# Patient Record
Sex: MALE | Employment: OTHER | URBAN - METROPOLITAN AREA
[De-identification: names, ages, dates, MRNs, and addresses within clinical notes are randomized per-mention and may not be internally consistent; named-entity substitution may affect disease eponyms.]

---

## 2024-09-30 ENCOUNTER — APPOINTMENT (OUTPATIENT)
Dept: ORTHOPEDIC SURGERY | Facility: CLINIC | Age: 69
End: 2024-09-30
Payer: MEDICARE

## 2024-10-09 ENCOUNTER — OFFICE VISIT (OUTPATIENT)
Dept: ORTHOPEDIC SURGERY | Facility: CLINIC | Age: 69
End: 2024-10-09
Payer: MEDICARE

## 2024-10-09 VITALS — BODY MASS INDEX: 26.68 KG/M2 | WEIGHT: 145 LBS | HEIGHT: 62 IN

## 2024-10-09 DIAGNOSIS — M79.642 LEFT HAND PAIN: Primary | ICD-10-CM

## 2024-10-09 DIAGNOSIS — M65.352 TRIGGER LITTLE FINGER OF LEFT HAND: ICD-10-CM

## 2024-10-09 PROCEDURE — 2500000004 HC RX 250 GENERAL PHARMACY W/ HCPCS (ALT 636 FOR OP/ED): Performed by: ORTHOPAEDIC SURGERY

## 2024-10-09 PROCEDURE — 1160F RVW MEDS BY RX/DR IN RCRD: CPT | Performed by: ORTHOPAEDIC SURGERY

## 2024-10-09 PROCEDURE — 99213 OFFICE O/P EST LOW 20 MIN: CPT | Performed by: ORTHOPAEDIC SURGERY

## 2024-10-09 PROCEDURE — 99203 OFFICE O/P NEW LOW 30 MIN: CPT | Performed by: ORTHOPAEDIC SURGERY

## 2024-10-09 PROCEDURE — 3008F BODY MASS INDEX DOCD: CPT | Performed by: ORTHOPAEDIC SURGERY

## 2024-10-09 PROCEDURE — 1036F TOBACCO NON-USER: CPT | Performed by: ORTHOPAEDIC SURGERY

## 2024-10-09 PROCEDURE — 1159F MED LIST DOCD IN RCRD: CPT | Performed by: ORTHOPAEDIC SURGERY

## 2024-10-09 PROCEDURE — 20550 NJX 1 TENDON SHEATH/LIGAMENT: CPT | Mod: LT | Performed by: ORTHOPAEDIC SURGERY

## 2024-10-09 RX ORDER — OLMESARTAN MEDOXOMIL AND HYDROCHLOROTHIAZIDE 20/12.5 20; 12.5 MG/1; MG/1
1 TABLET ORAL 2 TIMES DAILY
COMMUNITY

## 2024-10-09 RX ORDER — PRAVASTATIN SODIUM 40 MG/1
1 TABLET ORAL
COMMUNITY
Start: 2024-07-09

## 2024-10-09 RX ORDER — SODIUM BICARBONATE 650 MG/1
650 TABLET ORAL 3 TIMES DAILY
COMMUNITY
Start: 2024-05-16 | End: 2024-11-12

## 2024-10-09 RX ORDER — GEMFIBROZIL 600 MG/1
1 TABLET, FILM COATED ORAL
COMMUNITY
Start: 2024-09-21

## 2024-10-09 RX ORDER — ASPIRIN 81 MG/1
81 TABLET ORAL DAILY
COMMUNITY

## 2024-10-09 RX ORDER — TRIAMCINOLONE ACETONIDE 40 MG/ML
10 INJECTION, SUSPENSION INTRA-ARTICULAR; INTRAMUSCULAR
Status: COMPLETED | OUTPATIENT
Start: 2024-10-09 | End: 2024-10-09

## 2024-10-09 RX ORDER — AMLODIPINE BESYLATE 10 MG/1
1 TABLET ORAL
COMMUNITY
Start: 2024-09-21

## 2024-10-09 RX ORDER — LIDOCAINE HYDROCHLORIDE 20 MG/ML
0.5 INJECTION, SOLUTION INFILTRATION; PERINEURAL
Status: COMPLETED | OUTPATIENT
Start: 2024-10-09 | End: 2024-10-09

## 2024-10-09 RX ORDER — GLIPIZIDE 5 MG/1
1 TABLET, FILM COATED, EXTENDED RELEASE ORAL
COMMUNITY
Start: 2024-09-24

## 2024-10-09 NOTE — PROGRESS NOTES
Subjective    Patient ID: Rolf Worley is a 68 y.o. male.    Chief Complaint: Pain of the Left Little Finger     Last Surgery: No surgery found  Last Surgery Date: No surgery found    HPI  The patient is a 68-year-old right-hand-dominant male who comes in with a complaint of left little finger pain and locking.  This has been present for approximately the last 2 weeks.  His symptoms are worse in the morning.  The patient is a non-insulin-dependent diabetic.  He denies any trauma to his left hand.    Objective   Ortho Exam  The patient is in no acute distress.  Exam of his left hand and wrist reveals his skin envelope is intact.  He has full range of motion in his fingers.  He is tender to palpation over the left little finger A1 pulley.  The little finger was triggering with flexion at the PIP joint.  There is no evidence of any other spots of tenderness.    Assessment/Plan   Encounter Diagnoses:  Left hand pain    Trigger little finger of left hand    The patient has evidence of a left little finger trigger digit.  We discussed surgical versus conservative management.  He elected to undergo a Kenalog injection.    Hand / UE Inj/Asp: L small A1 for trigger finger on 10/9/2024 9:50 AM  Indications: tendon swelling  Details: 25 G needle, volar approach  Medications: 10 mg triamcinolone acetonide 40 mg/mL; 0.5 mL lidocaine 20 mg/mL (2 %)  Outcome: tolerated well, no immediate complications  Procedure, treatment alternatives, risks and benefits explained, specific risks discussed. Consent was given by the patient. Immediately prior to procedure a time out was called to verify the correct patient, procedure, equipment, support staff and site/side marked as required. Patient was prepped and draped in the usual sterile fashion.       Patient was informed to takes about a week for the injection have an effect.  He was informed he may have a transient increase in his blood glucose.  He may use his left hand is much as he  can tolerate.  He will follow-up as his symptoms dictate.

## 2025-01-02 ENCOUNTER — APPOINTMENT (OUTPATIENT)
Dept: CARDIOLOGY | Facility: CLINIC | Age: 70
End: 2025-01-02
Payer: MEDICARE

## 2025-03-13 PROBLEM — F17.201 TOBACCO ABUSE, IN REMISSION: Status: ACTIVE | Noted: 2021-02-26

## 2025-03-13 PROBLEM — E11.9 TYPE 2 DIABETES MELLITUS, WITHOUT LONG-TERM CURRENT USE OF INSULIN (MULTI): Status: ACTIVE | Noted: 2024-04-19

## 2025-03-13 PROBLEM — E11.51 TYPE 2 DIABETES MELLITUS WITH DIABETIC PERIPHERAL ANGIOPATHY WITHOUT GANGRENE, WITHOUT LONG-TERM CURRENT USE OF INSULIN (MULTI): Status: ACTIVE | Noted: 2024-04-19

## 2025-03-13 PROBLEM — I73.9 PAD (PERIPHERAL ARTERY DISEASE) (CMS-HCC): Chronic | Status: ACTIVE | Noted: 2018-02-27

## 2025-03-13 PROBLEM — I10 HTN (HYPERTENSION): Status: ACTIVE | Noted: 2025-03-13

## 2025-03-13 PROBLEM — Z87.891 FORMER SMOKER: Status: ACTIVE | Noted: 2021-02-26

## 2025-04-03 ENCOUNTER — OFFICE VISIT (OUTPATIENT)
Dept: CARDIOLOGY | Facility: CLINIC | Age: 70
End: 2025-04-03
Payer: MEDICARE

## 2025-04-03 VITALS
WEIGHT: 147 LBS | HEIGHT: 67 IN | OXYGEN SATURATION: 95 % | DIASTOLIC BLOOD PRESSURE: 60 MMHG | BODY MASS INDEX: 23.07 KG/M2 | SYSTOLIC BLOOD PRESSURE: 120 MMHG | HEART RATE: 105 BPM

## 2025-04-03 DIAGNOSIS — E78.5 DYSLIPIDEMIA: ICD-10-CM

## 2025-04-03 DIAGNOSIS — I10 HTN (HYPERTENSION): ICD-10-CM

## 2025-04-03 DIAGNOSIS — I73.9 PAD (PERIPHERAL ARTERY DISEASE) (CMS-HCC): Primary | Chronic | ICD-10-CM

## 2025-04-03 DIAGNOSIS — I73.9 PERIPHERAL VASCULAR DISEASE, UNSPECIFIED (CMS-HCC): ICD-10-CM

## 2025-04-03 DIAGNOSIS — N18.9 CKD (CHRONIC KIDNEY DISEASE): ICD-10-CM

## 2025-04-03 PROCEDURE — 1159F MED LIST DOCD IN RCRD: CPT | Performed by: INTERNAL MEDICINE

## 2025-04-03 PROCEDURE — 99214 OFFICE O/P EST MOD 30 MIN: CPT | Performed by: INTERNAL MEDICINE

## 2025-04-03 PROCEDURE — 3008F BODY MASS INDEX DOCD: CPT | Performed by: INTERNAL MEDICINE

## 2025-04-03 PROCEDURE — 3078F DIAST BP <80 MM HG: CPT | Performed by: INTERNAL MEDICINE

## 2025-04-03 PROCEDURE — 99204 OFFICE O/P NEW MOD 45 MIN: CPT | Performed by: INTERNAL MEDICINE

## 2025-04-03 PROCEDURE — 3074F SYST BP LT 130 MM HG: CPT | Performed by: INTERNAL MEDICINE

## 2025-04-03 RX ORDER — BLOOD SUGAR DIAGNOSTIC
1 STRIP MISCELLANEOUS AS NEEDED
COMMUNITY
Start: 2025-03-05

## 2025-04-03 RX ORDER — GLIPIZIDE 10 MG/1
10 TABLET, FILM COATED, EXTENDED RELEASE ORAL DAILY
COMMUNITY
Start: 2025-04-01

## 2025-04-03 RX ORDER — LANCETS 28 GAUGE
1 EACH MISCELLANEOUS AS NEEDED
COMMUNITY
Start: 2024-08-27

## 2025-04-03 RX ORDER — SODIUM POLYSTYRENE SULFONATE 4.1 MEQ/G
POWDER, FOR SUSPENSION ORAL; RECTAL ONCE
COMMUNITY
Start: 2025-03-21

## 2025-04-03 RX ORDER — DIAZEPAM 5 MG/1
1 TABLET ORAL NIGHTLY PRN
COMMUNITY
Start: 2025-03-18

## 2025-04-03 RX ORDER — ERGOCALCIFEROL 1.25 MG/1
50000 CAPSULE ORAL WEEKLY
COMMUNITY
Start: 2024-05-16

## 2025-04-03 RX ORDER — SODIUM BICARBONATE 650 MG/1
650 TABLET ORAL 3 TIMES DAILY
COMMUNITY

## 2025-04-03 RX ORDER — EZETIMIBE 10 MG/1
10 TABLET ORAL DAILY
Qty: 90 TABLET | Refills: 3 | Status: SHIPPED | OUTPATIENT
Start: 2025-04-03 | End: 2025-04-04 | Stop reason: SDUPTHER

## 2025-04-03 NOTE — PATIENT INSTRUCTIONS
Add on Zetia for goal LDL <70 (<55 is even better) - currently you are at 110.  BP is good - keep up the good work  I will evaluate your kidney artery based on 2016 CT scan  Follow up 1 year with blood pressure cuff test

## 2025-04-03 NOTE — LETTER
April 11, 2025     Costa Vega MD  [address]  [facility] [place], [address]  [facility], [id number]    Patient: Rolf Worley   YOB: 1955   Date of Visit: 4/3/2025       Dear Dr. Costa Vega MD:    Thank you for referring Rolf Worley to me for evaluation. Below are my notes for this consultation.  If you have questions, please do not hesitate to call me. I look forward to following your patient along with you.       Sincerely,     Saad Kovacs MD      CC: No Recipients  ______________________________________________________________________________________    PCP: Costa Vega MD  Nephrologist: TBD    Subjective  Rolf Worley is a 69 y.o. male who is here to discuss PAD .     Dr. Rogel performed stent in R iliac artery 8 years ago - he is retired. Transitioning care.  No claudication, no wounds.    Quit smoking 8 y ago at time of stent.     Review of Systems:  Otherwise, limited cardiovascular review of systems is negative.    Past Medical History:  He has no past medical history on file.    Surgical History:   He has no past surgical history on file.    Family History:   No family history on file.    Social History:   Tobacco Use: Medium Risk (4/3/2025)    Patient History    • Smoking Tobacco Use: Former    • Smokeless Tobacco Use: Never    • Passive Exposure: Not on file       Outpatient Medications:    Current Outpatient Medications:   •  Accu-Chek Guide test strips, Apply 1 strip topically if needed., Disp: , Rfl:   •  amLODIPine (Norvasc) 10 mg tablet, Take 1 tablet (10 mg) by mouth early in the morning.., Disp: , Rfl:   •  aspirin 81 mg EC tablet, Take 1 tablet (81 mg) by mouth once daily., Disp: , Rfl:   •  diazePAM (Valium) 5 mg tablet, Take 1 tablet (5 mg) by mouth as needed at bedtime for anxiety., Disp: , Rfl:   •  ergocalciferol (Vitamin D-2) 1250 mcg (50,000 units) capsule, Take 1 capsule (50,000 Units) by mouth once a week., Disp: , Rfl:   •  gemfibrozil (Lopid) 600 mg tablet, Take 1  "tablet (600 mg) by mouth every 12 hours., Disp: , Rfl:   •  glipiZIDE XL (Glucotrol XL) 10 mg 24 hr tablet, Take 1 tablet (10 mg) by mouth once daily., Disp: , Rfl:   •  olmesartan-hydrochlorothiazide (BENIcar HCT) 20-12.5 mg tablet, Take 1 tablet by mouth 2 times a day., Disp: , Rfl:   •  pravastatin (Pravachol) 40 mg tablet, Take 1 tablet (40 mg) by mouth every 12 hours., Disp: , Rfl:   •  sodium bicarbonate 650 mg tablet, Take 1 tablet (650 mg) by mouth 3 times a day., Disp: , Rfl:   •  sodium polystyrene sulfonate (Kayexalate) powder, Take by mouth 1 time., Disp: , Rfl:   •  Unilet Lancet 28 gauge, 1 each if needed., Disp: , Rfl:      Allergies:  Diphen-allergy, Penicillins, Phenobarbital, and Sodium phenolate       Objective  Vital Signs:  /60 (BP Location: Left arm, Patient Position: Sitting, BP Cuff Size: Adult)   Pulse 105   Ht 1.702 m (5' 7\")   Wt 66.7 kg (147 lb)   SpO2 95%   BMI 23.02 kg/m²     Physical Exam:  General: no acute distress  HEENT: EOMI, no scleral icterus.  Lungs: Clear to auscultation bilaterally without wheezing, rales, or rhonchi.  Cardiovascular: Regular rhythm and rate. Normal S1 and S2. No murmurs, rubs, or gallops are appreciated. JVP normal.  no bruits  Abdomen: Soft, nontender, nondistended. Bowel sounds present.  Extremities: Warm and well perfused with equal 2+ pulses bilaterally.  No edema present.  Neurologic: Alert and oriented x3.    Pertinent Recent Cardiovascular Studies (personally reviewed):  NA    Laboratory values:  CMP:  Recent Labs     12/12/22  1119      K 4.9      CO2 22   ANIONGAP 15   BUN 31*   CREATININE 1.71*     Recent Labs     12/12/22  1119   ALT 47     CBC:  Recent Labs     12/12/22  1119   WBC 6.8   HGB 13.7   HCT 42.4      MCV 91     HEME/ENDO:  Recent Labs     03/20/25  1110 03/19/24  0953 12/12/22  1119   HGBA1C 7.7* 7.3* 9.8*      CARDIAC:   Recent Labs     12/12/22  1119   CHOL 260*   HDL 39.4*   TRIG 350*    "   Component  Ref Range & Units 2 wk ago   Cholesterol, Total  <200 mg/dL 195   Comment: <200 mg/dL, Desirable   200-239 mg/dL, Borderline high  >239 mg/dL, High   Triglyceride  <150 mg/dL 230 High    Comment: <150 mg/dL, Normal   150-199 mg/dL, Borderline high   200-499 mg/dL, High  >499 mg/dL, Very high   HDL Cholesterol  >39 mg/dL 39 Low    Comment:  40-59 mg/dL, Acceptable  >59 mg/dL, High: Negative risk factor for coronary heart disease  <40 mg/dL, Low: Positive risk factor for coronary heart disease   Non HDL Cholesterol  <130 mg/dL 156 High    Comment: <130 mg/dL, Optimal   130-159 mg/dL, Near optimal/above optimal   160-189 mg/dL, Borderline high   190-219 mg/dL, High  >219 mg/dL, Very high  Secondary prevention optimal non HDL Cholesterol levels are recommended to be <100 mg/dL   Fasting Time  hrs 10   VLDL Cholesterol  <30 mg/dL 46 High    TC:HDL Ratio  <5.10 5   LDL Cholesterol  <100 mg/dL 110 High    Comment: <100 mg/dL, Optimal   100-129 mg/dL, Near optimal/above optimal   130-159 mg/dL, Borderline high   160-189 mg/dL, High  >189 mg/dL, Very high  Secondary prevention optimal LDL Cholesterol levels are recommended to be < 70 mg/dL   LDL:HDL Ratio  <2.54 2.82 High    Comment: Reference:  1. National Cholesterol Education Program ATP III Guideline At-A-Glance Quick Desk Reference: National Heart, Lung, and Blood [place]. National Institutes of Health. 2001: NIH Publication No. .  2. An International Atherosclerosis Society position paper: global recommendations for the management of dyslipidemia: executive summary, Atherosclerosis. 2014: 232(2):410-413.        I have personally reviewed most recent PCP, cardiology, vascular, and/or podiatry documentation.      Assessment/Plan  69 y.o. male with peripheral artery disease s/p iliac stent R (Dr. Rogel 8 y ago) in the background of diabetes mellitus, dyslipidemia, hypertension, and CKD (Cr 1.7) .    Plan:  Add on Zetia for goal LDL <70 (<55 is even  better)  BP is at goal on amlodipine, ARB/hydrochlorothiazide  I have reviewed CT A/P 2015 - noncontrasted but no significant calcium in renal arteries  If c/f SYLWIA driving CKD, can obtain Duplex  Suspicion low as BP well controlled  Follow up 1 year with LESLIE exercise         Saad Kovacs MD, FACC, FSCAI, RPVI  Co-Director, Vascular Center, and  Co-Director, Pulmonary Embolism Response Team,   [facility] [place]                                 of Medicine,                                                                 [employer] [facility] [employer]

## 2025-04-03 NOTE — PROGRESS NOTES
PCP: Costa Vega MD  Nephrologist: MONICA Ibarra   Rolf Worley is a 69 y.o. male who {actions; complains of:15988}{JLVASCSXS:86545}. Stent in R iliac artery 8 years ago. Naar retired.   Quit 8 y ago.  Cr 1.7    Cardiovascular risk factors include: {risk factors:510}.      Review of Systems:  Otherwise, limited cardiovascular review of systems is negative.    Past Medical History:  He has no past medical history on file.    Surgical History:   He has no past surgical history on file.    Family History:   No family history on file.    Social History:   Tobacco Use: Medium Risk (4/3/2025)    Patient History     Smoking Tobacco Use: Former     Smokeless Tobacco Use: Never     Passive Exposure: Not on file       Outpatient Medications:    Current Outpatient Medications:     Accu-Chek Guide test strips, Apply 1 strip topically if needed., Disp: , Rfl:     amLODIPine (Norvasc) 10 mg tablet, Take 1 tablet (10 mg) by mouth early in the morning.., Disp: , Rfl:     aspirin 81 mg EC tablet, Take 1 tablet (81 mg) by mouth once daily., Disp: , Rfl:     diazePAM (Valium) 5 mg tablet, Take 1 tablet (5 mg) by mouth as needed at bedtime for anxiety., Disp: , Rfl:     ergocalciferol (Vitamin D-2) 1250 mcg (50,000 units) capsule, Take 1 capsule (50,000 Units) by mouth once a week., Disp: , Rfl:     gemfibrozil (Lopid) 600 mg tablet, Take 1 tablet (600 mg) by mouth every 12 hours., Disp: , Rfl:     glipiZIDE XL (Glucotrol XL) 10 mg 24 hr tablet, Take 1 tablet (10 mg) by mouth once daily., Disp: , Rfl:     olmesartan-hydrochlorothiazide (BENIcar HCT) 20-12.5 mg tablet, Take 1 tablet by mouth 2 times a day., Disp: , Rfl:     pravastatin (Pravachol) 40 mg tablet, Take 1 tablet (40 mg) by mouth every 12 hours., Disp: , Rfl:     sodium bicarbonate 650 mg tablet, Take 1 tablet (650 mg) by mouth 3 times a day., Disp: , Rfl:     sodium polystyrene sulfonate (Kayexalate) powder, Take by mouth 1 time., Disp: , Rfl:     Unilet Lancet  "28 gauge, 1 each if needed., Disp: , Rfl:      Allergies:  Diphen-allergy, Penicillins, Phenobarbital, and Sodium phenolate       Objective   Vital Signs:  /60 (BP Location: Left arm, Patient Position: Sitting, BP Cuff Size: Adult)   Pulse 105   Ht 1.702 m (5' 7\")   Wt 66.7 kg (147 lb)   SpO2 95%   BMI 23.02 kg/m²     Physical Exam:  {[facility]::\"General: no acute distress\",\"HEENT: EOMI, no scleral icterus.\",\"Lungs: Clear to auscultation bilaterally without wheezing, rales, or rhonchi.\",\"Cardiovascular: Regular rhythm and rate. Normal S1 and S2. No murmurs, rubs, or gallops are appreciated. JVP normal.\",\"Abdomen: Soft, nontender, nondistended. Bowel sounds present.\",\"Extremities: Warm and well perfused with equal 2+ pulses bilaterally.  No edema present.\",\"Neurologic: Alert and oriented x3.\"}    Pertinent Recent Cardiovascular Studies (personally reviewed):  Vascular studies:  LESLIE/TBI 4/3/2025: {JLABI:15418}  Carotid duplex 4/3/2025: ***  Venous duplex 4/3/2025: ***  Renal duplex 4/3/2025: ***    Cardiac studies:  Echocardiogram ***: {findings; echo:03655}  ECG ***: {findings; ec}    Laboratory values:  CMP:  Recent Labs     22  1119      K 4.9      CO2 22   ANIONGAP 15   BUN 31*   CREATININE 1.71*     Recent Labs     22  1119   ALT 47     CBC:  Recent Labs     22  1119   WBC 6.8   HGB 13.7   HCT 42.4      MCV 91     COAG: No results for input(s): \"PTT\", \"INR\", \"HAUF\", \"DDIMERVTE\", \"HAPTOGLOBIN\", \"FIBRINOGEN\" in the last 46397 hours.  ABO: No results for input(s): \"ABO\" in the last 84004 hours.  HEME/ENDO:  Recent Labs     25  1110 24  0953 22  1119   HGBA1C 7.7* 7.3* 9.8*      CARDIAC: No results for input(s): \"LDH\", \"CKMB\", \"TROPHS\", \"BNP\" in the last 61596 hours.    No lab exists for component: \"CK\", \"CKMBP\"  Recent Labs     22  1119   CHOL 260*   HDL 39.4*   TRIG 350*   No results found for: \"LDLCALC\"  MICRO: No results for " "input(s): \"ESR\", \"CRP\", \"PROCAL\" in the last 30204 hours.  No results found for the last 90 days.     Component  Ref Range & Units 2 wk ago   Cholesterol, Total  <200 mg/dL 195   Comment: <200 mg/dL, Desirable   200-239 mg/dL, Borderline high  >239 mg/dL, High   Triglyceride  <150 mg/dL 230 High    Comment: <150 mg/dL, Normal   150-199 mg/dL, Borderline high   200-499 mg/dL, High  >499 mg/dL, Very high   HDL Cholesterol  >39 mg/dL 39 Low    Comment:  40-59 mg/dL, Acceptable  >59 mg/dL, High: Negative risk factor for coronary heart disease  <40 mg/dL, Low: Positive risk factor for coronary heart disease   Non HDL Cholesterol  <130 mg/dL 156 High    Comment: <130 mg/dL, Optimal   130-159 mg/dL, Near optimal/above optimal   160-189 mg/dL, Borderline high   190-219 mg/dL, High  >219 mg/dL, Very high  Secondary prevention optimal non HDL Cholesterol levels are recommended to be <100 mg/dL   Fasting Time  hrs 10   VLDL Cholesterol  <30 mg/dL 46 High    TC:HDL Ratio  <5.10 5   LDL Cholesterol  <100 mg/dL 110 High    Comment: <100 mg/dL, Optimal   100-129 mg/dL, Near optimal/above optimal   130-159 mg/dL, Borderline high   160-189 mg/dL, High  >189 mg/dL, Very high  Secondary prevention optimal LDL Cholesterol levels are recommended to be < 70 mg/dL   LDL:HDL Ratio  <2.54 2.82 High    Comment: Reference:  1. National Cholesterol Education Program ATP III Guideline At-A-Glance Quick Desk Reference: National Heart, Lung, and Blood [place]. National Institutes of Health. 2001: NIH Publication No. .  2. An International Atherosclerosis Society position paper: global recommendations for the management of dyslipidemia: executive summary, Atherosclerosis. 2014: 232(2):410-413.        I have personally reviewed most recent PCP, cardiology, vascular, and/or podiatry documentation.      Assessment/Plan   69 y.o. male with {JLCARDAP:70774} in the background of {risk factors:510}.    Plan:  ***         Saad Kovacs MD, FACC, [facility], " ARMANDO  Co-Director, Vascular Center, and  Co-Director, Pulmonary Embolism Response Team,   [facility] [place]                                 of Medicine,                                                                 [facility] [employer]

## 2025-04-04 ENCOUNTER — TELEPHONE (OUTPATIENT)
Dept: CARDIOLOGY | Facility: CLINIC | Age: 70
End: 2025-04-04
Payer: MEDICARE

## 2025-04-04 DIAGNOSIS — I73.9 PAD (PERIPHERAL ARTERY DISEASE) (CMS-HCC): Chronic | ICD-10-CM

## 2025-04-04 DIAGNOSIS — E78.5 DYSLIPIDEMIA: ICD-10-CM

## 2025-04-04 NOTE — TELEPHONE ENCOUNTER
Pt called today, needs Zetia sent to [facility] in [place] on [address]. It was sent to [facility] pharmacy, and he doesn't use this pharmacy.     Pended Leanna to Dr Kovacs.

## 2025-04-06 RX ORDER — EZETIMIBE 10 MG/1
10 TABLET ORAL DAILY
Qty: 90 TABLET | Refills: 3 | Status: SHIPPED | OUTPATIENT
Start: 2025-04-06 | End: 2026-04-06

## 2025-04-07 ENCOUNTER — TELEPHONE (OUTPATIENT)
Dept: CARDIOLOGY | Facility: CLINIC | Age: 70
End: 2025-04-07
Payer: MEDICARE

## 2025-04-07 NOTE — TELEPHONE ENCOUNTER
I called [facility],they did receive the Zetia script but it is too soon to fill. Another pharmacy filled it on 4/4.

## 2025-04-28 ENCOUNTER — OFFICE VISIT (OUTPATIENT)
Dept: ORTHOPEDIC SURGERY | Facility: CLINIC | Age: 70
End: 2025-04-28
Payer: MEDICARE

## 2025-04-28 VITALS — WEIGHT: 147 LBS | HEIGHT: 67 IN | BODY MASS INDEX: 23.07 KG/M2

## 2025-04-28 DIAGNOSIS — M25.521 RIGHT ELBOW PAIN: Primary | ICD-10-CM

## 2025-04-28 DIAGNOSIS — M77.11 RIGHT LATERAL EPICONDYLITIS: ICD-10-CM

## 2025-04-28 PROCEDURE — 1159F MED LIST DOCD IN RCRD: CPT | Performed by: ORTHOPAEDIC SURGERY

## 2025-04-28 PROCEDURE — 20551 NJX 1 TENDON ORIGIN/INSJ: CPT | Mod: RT | Performed by: ORTHOPAEDIC SURGERY

## 2025-04-28 PROCEDURE — 99213 OFFICE O/P EST LOW 20 MIN: CPT | Performed by: ORTHOPAEDIC SURGERY

## 2025-04-28 PROCEDURE — 1160F RVW MEDS BY RX/DR IN RCRD: CPT | Performed by: ORTHOPAEDIC SURGERY

## 2025-04-28 PROCEDURE — 3008F BODY MASS INDEX DOCD: CPT | Performed by: ORTHOPAEDIC SURGERY

## 2025-04-28 PROCEDURE — 2500000004 HC RX 250 GENERAL PHARMACY W/ HCPCS (ALT 636 FOR OP/ED): Performed by: ORTHOPAEDIC SURGERY

## 2025-04-28 PROCEDURE — 99213 OFFICE O/P EST LOW 20 MIN: CPT | Mod: 25 | Performed by: ORTHOPAEDIC SURGERY

## 2025-04-28 PROCEDURE — 1036F TOBACCO NON-USER: CPT | Performed by: ORTHOPAEDIC SURGERY

## 2025-04-28 RX ORDER — LIDOCAINE HYDROCHLORIDE 20 MG/ML
1 INJECTION, SOLUTION INFILTRATION; PERINEURAL
Status: COMPLETED | OUTPATIENT
Start: 2025-04-28 | End: 2025-04-28

## 2025-04-28 RX ORDER — TRIAMCINOLONE ACETONIDE 40 MG/ML
40 INJECTION, SUSPENSION INTRA-ARTICULAR; INTRAMUSCULAR
Status: COMPLETED | OUTPATIENT
Start: 2025-04-28 | End: 2025-04-28

## 2025-04-28 RX ADMIN — TRIAMCINOLONE ACETONIDE 40 MG: 40 INJECTION, SUSPENSION INTRA-ARTICULAR; INTRAMUSCULAR at 10:05

## 2025-04-28 RX ADMIN — LIDOCAINE HYDROCHLORIDE 1 ML: 20 INJECTION, SOLUTION INFILTRATION; PERINEURAL at 10:05

## 2025-04-28 NOTE — PROGRESS NOTES
Subjective    Patient ID: Rolf Worley is a 69 y.o. male.    Chief Complaint: Pain of the Right Elbow     Last Surgery: No surgery found  Last Surgery Date: No surgery found    HPI  The patient is a 69-year-old left-hand-dominant male who comes in with a 3-month history of right elbow pain.  He does not recall any specific trauma.  He denies numbness and paresthesias.  He has not had any previous treatment.  Most of it is lateral in nature.    Objective   Ortho Exam  The patient is in no acute distress.  Exam of his right elbow reveals the skin envelope is intact.  There is no warmth erythema.  There is no swelling.  He is tender over the lateral epicondyle.  He has no tenderness within the radial tunnel.  He has no tenderness over the medial epicondyle.  He has increased pain with resisted wrist and finger extension.  There is no increased pain with resisted wrist or finger flexion.      Assessment/Plan   Encounter Diagnoses:  Right elbow pain    Right lateral epicondylitis    The patient has evidence of right lateral epicondylitis.  We discussed treatment options for this and he has elected to treated conservatively.  He wished undergo a Kenalog injection today.    Hand / UE Inj/Asp: R elbow for lateral epicondylitis on 4/28/2025 10:05 AM  Indications: pain and tendon swelling  Details: 25 G needle, lateral approach  Medications: 40 mg triamcinolone acetonide 40 mg/mL; 1 mL lidocaine 20 mg/mL (2 %)  Outcome: tolerated well, no immediate complications  Procedure, treatment alternatives, risks and benefits explained, specific risks discussed. Consent was given by the patient. Immediately prior to procedure a time out was called to verify the correct patient, procedure, equipment, support staff and site/side marked as required. Patient was prepped and draped in the usual sterile fashion.       Patient was informed to takes about a week for the injection of an effect.  He also will perform therapy exercises.  He will  follow-up as his symptoms dictate.     pmd,   Phone: (   )    -  Fax: (   )    -  Follow Up Time:

## 2025-07-31 ENCOUNTER — HOSPITAL ENCOUNTER (EMERGENCY)
Facility: HOSPITAL | Age: 70
Discharge: LEFT BEFORE DISCHARGE | End: 2025-07-31
Payer: MEDICARE

## 2025-07-31 PROCEDURE — 4500999001 HC ED NO CHARGE
